# Patient Record
Sex: FEMALE | Race: WHITE | NOT HISPANIC OR LATINO | Employment: UNEMPLOYED | URBAN - METROPOLITAN AREA
[De-identification: names, ages, dates, MRNs, and addresses within clinical notes are randomized per-mention and may not be internally consistent; named-entity substitution may affect disease eponyms.]

---

## 2020-03-16 ENCOUNTER — HOSPITAL ENCOUNTER (EMERGENCY)
Facility: HOSPITAL | Age: 55
Discharge: HOME | End: 2020-03-16

## 2020-03-16 VITALS
DIASTOLIC BLOOD PRESSURE: 84 MMHG | OXYGEN SATURATION: 99 % | HEART RATE: 107 BPM | BODY MASS INDEX: 24.34 KG/M2 | RESPIRATION RATE: 21 BRPM | HEIGHT: 70 IN | TEMPERATURE: 99 F | SYSTOLIC BLOOD PRESSURE: 127 MMHG | WEIGHT: 170 LBS

## 2020-03-16 DIAGNOSIS — J06.9 VIRAL URI WITH COUGH: Primary | ICD-10-CM

## 2020-03-16 LAB
INFLUENZA A, MOLECULAR: NEGATIVE
INFLUENZA B, MOLECULAR: NEGATIVE
SPECIMEN SOURCE: NORMAL

## 2020-03-16 PROCEDURE — 87502 INFLUENZA DNA AMP PROBE: CPT

## 2020-03-16 PROCEDURE — U0002 COVID-19 LAB TEST NON-CDC: HCPCS

## 2020-03-16 PROCEDURE — 99282 EMERGENCY DEPT VISIT SF MDM: CPT

## 2020-03-16 NOTE — ED PROVIDER NOTES
Encounter Date: 3/16/2020    SCRIBE #1 NOTE: I, Fredo Long, eli scribing for, and in the presence of, Selena Alston NP.       History     Chief Complaint   Patient presents with    Fever    Cough     clear     Generalized Body Aches     since last night       Time seen by provider: 12:13 PM on 03/16/2020    Shari Stevens is a 54 y.o. female who presents to the ED with an onset of fever recorded up to 101.1° with associated generalized body aches and dry cough since last night. The patient states that she typically works in [place] and recently filed for unemployment. She endorses having ample amounts of Albuterol at home as well as a nebulizer. The patient takes Mobic for IBS. She denies any worsening or new shortness of breath, wheezing, or chest pain. PMHx includes arthritis and COPD. She is post-menopausal. No history of immuno-suppressant medications. No cardiopulmonary PSHx. SHx of current every day smoking, 0.5 PPD. NKDA.    The history is provided by the patient.     Review of patient's allergies indicates:  No Known Allergies  No past medical history on file.  No past surgical history on file.  No family history on file.  Social History     Tobacco Use    Smoking status: Not on file   Substance Use Topics    Alcohol use: Not on file    Drug use: Not on file     Review of Systems   Constitutional: Positive for fever.   HENT: Negative for sore throat.    Respiratory: Positive for cough. Negative for shortness of breath and wheezing.    Cardiovascular: Negative for chest pain.   Gastrointestinal: Negative for nausea.   Genitourinary: Negative for dysuria.   Musculoskeletal: Positive for arthralgias and myalgias. Negative for back pain.   Skin: Negative for rash.   Allergic/Immunologic: Negative for immunocompromised state.   Neurological: Negative for weakness.   Hematological: Does not bruise/bleed easily.       Physical Exam     Initial Vitals [03/16/20 1203]   BP Pulse Resp Temp SpO2    127/84 107 (!) 21 98.8 °F (37.1 °C) 99 %      MAP       --         Physical Exam    Nursing note and vitals reviewed.  Constitutional: Vital signs are normal. She appears well-developed and well-nourished.   HENT:   Head: Normocephalic and atraumatic.   Eyes: Pupils are equal, round, and reactive to light.   Neck: Neck supple.   Cardiovascular: Normal rate, regular rhythm, normal heart sounds and intact distal pulses. Exam reveals no gallop and no friction rub.    No murmur heard.  Regular rate and rhythm. Heart sounds are normal with no rubs, gallops, or murmurs.   Pulmonary/Chest: Breath sounds normal. No respiratory distress. She has no wheezes. She has no rhonchi. She has no rales.   Lung sounds clear and equal to auscultation bilaterally without wheezes, rhonchi, or rales.   Abdominal: Soft. Normal appearance and bowel sounds are normal. There is no tenderness.   Neurological: She is alert and oriented to person, place, and time. She has normal strength.   Skin: Skin is warm, dry and intact.   Psychiatric: She has a normal mood and affect. Her speech is normal and behavior is normal.         ED Course   Procedures  Labs Reviewed   INFLUENZA A & B BY MOLECULAR   SARS-COV-2 (COVID-19) QUALITATIVE PCR          Imaging Results    None          Medical Decision Making:   History:   Old Medical Records: I decided to obtain old medical records.  Differential Diagnosis:   URI  Pneumonia  Bronchitits  Clinical Tests:   Lab Tests: Ordered and Reviewed       APC / Resident Notes:   Patient is a 54 y.o. female who presents to the ED 03/16/2020 who underwent emergent evaluation for fever and cough for 1 day.   The patient appears to have a viral URI. Influenza testing negative. no sick contact with COVID 19. COVID19 testing pending given + risk factors and symptoms.   Patient instructed to remain in isolation for 14 days and practice standard precatuions.  Patient in no acute respiratory distress; she has no wheezing or  rhonchi. I do not think pneumonia or COPD exacerbation. She is a current every day smoker and is counseled on smoking cessation. Patient has no hypoxia or tachypnea and no accessory muscle use.   Patient does not appear septic or toxic. I do no think secondary bacterial infection such as bacterial pneumonia, sinusitis, sepsis, meningitis, or strep pharyngitis.  The patient does not appear dehydrated and is tolerating po fluids and urinating regularly today. I see no indication for antibiotics at this time and discussed supportive care at home with OTC medications. Based on my clinical evaluation, I do not appreciate any immediate, emergent, or life threatening condition or etiology that warrants additional workup today and feel that the patient can be discharged with close follow up care. Follow up and return precautions discussed; patient verbalized understanding and is agreeable to plan of care. Patient discharged home in stable condition.              Scribe Attestation:   Scribe #1: I performed the above scribed service and the documentation accurately describes the services I performed. I attest to the accuracy of the note.    Attending Attestation:           Physician Attestation for Scribe:  Physician Attestation Statement for Scribe #1: I, Selena Alston, reviewed documentation, as scribed by in my presence, and it is both accurate and complete.     Comments: I, Selena Alston NP-C, personally performed the services described in this documentation. All medical record entries made by the scribe were at my direction and in my presence.  I have reviewed the chart and agree that the record reflects my personal performance and is accurate and complete. AMRIT Alan.  12:32 PM 03/16/2020                            Clinical Impression:       ICD-10-CM ICD-9-CM   1. Viral URI with cough J06.9 465.9    B97.89          Disposition:   Disposition: Discharged  Condition: Stable     ED Disposition Condition     Discharge Stable        ED Prescriptions     None        Follow-up Information     Follow up With Specialties Details Why Contact Info    [facility]  In 2 days  [address] [address]  [phone]      [facility] Emergency Medicine  As needed, If symptoms worsen [address] [id number] [phone]                                     Selena Alston NP  03/16/20 4248

## 2020-03-16 NOTE — DISCHARGE INSTRUCTIONS
You've been tested for COVID-19. Your test results may take several days. You should follow-up with your doctor for test results. Otherwise, please self isolate at home for 14 days to avoid infecting other people.

## 2020-03-16 NOTE — ED NOTES
Pt here for evaluation of cough, fever and body aches since last night.  Pt is awake, alert and oriented. Resp non-labored.  Pt has productive cough with clear sputum.  Pt reports fever since last night.  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

## 2020-03-24 LAB — SARS-COV-2 RNA RESP QL NAA+PROBE: NOT DETECTED

## 2023-12-07 ENCOUNTER — HOSPITAL ENCOUNTER (EMERGENCY)
Facility: HOSPITAL | Age: 58
Discharge: HOME | End: 2023-12-07
Attending: EMERGENCY MEDICINE

## 2023-12-07 VITALS
SYSTOLIC BLOOD PRESSURE: 149 MMHG | DIASTOLIC BLOOD PRESSURE: 101 MMHG | WEIGHT: 180 LBS | TEMPERATURE: 98 F | OXYGEN SATURATION: 99 % | RESPIRATION RATE: 18 BRPM | HEART RATE: 91 BPM | BODY MASS INDEX: 26.66 KG/M2 | HEIGHT: 69 IN

## 2023-12-07 DIAGNOSIS — M54.50 ACUTE MIDLINE LOW BACK PAIN WITHOUT SCIATICA: Primary | ICD-10-CM

## 2023-12-07 LAB
BACTERIA #/AREA URNS HPF: NORMAL /HPF
BILIRUB UR QL STRIP: NEGATIVE
CLARITY UR: CLEAR
COLOR UR: YELLOW
GLUCOSE UR QL STRIP: NEGATIVE
HGB UR QL STRIP: NEGATIVE
KETONES UR QL STRIP: NEGATIVE
LEUKOCYTE ESTERASE UR QL STRIP: ABNORMAL
MICROSCOPIC COMMENT: NORMAL
NITRITE UR QL STRIP: NEGATIVE
PH UR STRIP: 6 [PH] (ref 5–8)
PROT UR QL STRIP: NEGATIVE
RBC #/AREA URNS HPF: 2 /HPF (ref 0–4)
SP GR UR STRIP: 1.02 (ref 1–1.03)
SQUAMOUS #/AREA URNS HPF: 5 /HPF
URN SPEC COLLECT METH UR: ABNORMAL
UROBILINOGEN UR STRIP-ACNC: NEGATIVE EU/DL
WBC #/AREA URNS HPF: 4 /HPF (ref 0–5)
YEAST URNS QL MICRO: NORMAL

## 2023-12-07 PROCEDURE — 63600175 PHARM REV CODE 636 W HCPCS: Performed by: NURSE PRACTITIONER

## 2023-12-07 PROCEDURE — 96372 THER/PROPH/DIAG INJ SC/IM: CPT | Performed by: NURSE PRACTITIONER

## 2023-12-07 PROCEDURE — 99284 EMERGENCY DEPT VISIT MOD MDM: CPT

## 2023-12-07 PROCEDURE — 81000 URINALYSIS NONAUTO W/SCOPE: CPT | Performed by: NURSE PRACTITIONER

## 2023-12-07 RX ORDER — MELOXICAM 15 MG/1
15 TABLET ORAL DAILY
Qty: 14 TABLET | Refills: 0 | Status: SHIPPED | OUTPATIENT
Start: 2023-12-07 | End: 2023-12-21

## 2023-12-07 RX ORDER — KETOROLAC TROMETHAMINE 30 MG/ML
30 INJECTION, SOLUTION INTRAMUSCULAR; INTRAVENOUS
Status: COMPLETED | OUTPATIENT
Start: 2023-12-07 | End: 2023-12-07

## 2023-12-07 RX ADMIN — KETOROLAC TROMETHAMINE 30 MG: 30 INJECTION, SOLUTION INTRAMUSCULAR at 09:12

## 2023-12-07 NOTE — ED PROVIDER NOTES
"Source of History:  Patient, chart    Chief complaint:  Back Pain (Lower back pain x 2 months -- denies injury. Currently on day 4 bactrim for UTI)      HPI:  Shari Stevens is a 58 y.o. female presenting with  midline low back pain.  Patient states pain has been ongoing for the past 2 months.  Patient denies any falls or trauma.  Patient states she was recently started on gabapentin but does not take it.  Patient states I had a friend who lost her memory because of it.  Patient states she is been taking BC powders and ibuprofen 6 with minimal relief.  Patient denies any bowel or bladder incontinence.  No numbness or tingling of groin.    This is the extent to the patients complaints today here in the emergency department.    ROS: As per HPI and below:  Constitutional: No fever.  No chills.  Eyes: No visual changes.   ENT: No sore throat. No ear pain.  Urinary: No abnormal urination.  MSK: Positive for back pain  Integument: No rashes or lesions.    Review of patient's allergies indicates:   Allergen Reactions    Cephalexin Diarrhea       PMH:  As per HPI and below:  No past medical history on file.  No past surgical history on file.         Physical Exam:    BP (!) 149/101   Pulse 91   Temp 98 °F (36.7 °C) (Oral)   Resp 18   Ht 5' 9" (1.753 m)   Wt 81.6 kg (180 lb)   SpO2 99%   BMI 26.58 kg/m²   Nursing note and vital signs reviewed.  Constitutional: No acute distress.  Nontoxic  Head:  Normocephalic atraumatic  Eyes: No conjunctival injection.  Extraocular muscles are intact.  ENT: Normal phonation.  Musculoskeletal:  Pt endorsing pain to mid lower back.  Pain not reproducible.  Negative bilateral straight leg test.  Steady gait appreciated.  Strength 5/5 in BLE.    Skin: No rashes seen.  Good turgor.  No abrasions.  No ecchymoses.  Psych: Anxious appearing, conversant.    [facility]    Emergent evaluation of an anxious appearing 59 yo female presenting with midline low back pain for the past 2 months.  " Patient states she takes ibuprofen and BC powders with no relief of pain.  Patient states she was recently prescribed gabapentin but is not taking.  Patient denies any numbness or tingling of groin.  No bowel or bladder incontinence.  On exam pt is A&Ox3. VSS. Nonfebrile and nontoxic appearing.  Mucous membranes pink and moist. Abdomen soft and nontender. No rebound or guarding appreciated on exam.   BS WNL.  No CVA tenderness to palpation. Negative bilateral straight leg test.  Steady gait appreciated.  Strength 5/5 in BLE. No red flags on exam.  Pt speaking in full sentences. Cap refill < 3 seconds.      History Acquisition   Additional history was acquired from other historians.  Chart    The patient's list of active medical problems, social history, medications, and allergies as documented per RN staff has been reviewed.     Differential Diagnoses   Based on available information and the initial assessment, the working differential diagnoses considered during this evaluation include but are not limited to DJD, arthritis, chronic pain, radiculopathy, sciatica, others.  I considered but do not suspect cauda equina syndrome, fracture or spinal abscess due to presentation.    I will get urine, medicate and reassess      LABS     Labs Reviewed   URINALYSIS, REFLEX TO URINE CULTURE - Abnormal; Notable for the following components:       Result Value    Leukocytes, UA 1+ (*)     All other components within normal limits    Narrative:     Specimen Source->Urine   URINALYSIS MICROSCOPIC    Narrative:     Specimen Source->Urine     Additional Consideration   All available testing was considered during the course of this workup.  Comorbidities taken into consideration during the patient's evaluation and treatment include weight, age.    Social determinants of health were taken into consideration during development of our treatment plan.    Medications   ketorolac injection 30 mg (30 mg Intramuscular Given 12/7/23 0922)       ED Course as of 12/07/23 1053   Thu Dec 07, 2023   1030 UA negative for any acute infectious process.  Patient updated on results.  Advised to continue with antibiotics as prescribed.  Advised to follow up with PCP for chronic pain.  Advised will place referral for orthopedic follow-up.  Patient given strict return to ED precautions.  Patient verbalized understanding of this plan of care.  All questions and concerns addressed. [RZ]   1041 Patient is hemodynamically stable, vital signs are normal. Discharge instructions given. Return to ED precautions discussed. Follow up as directed. Pt verbalized understanding of this plan.  Pt is stable for discharge.  [RZ]      ED Course User Index  [RZ] Angela Lopez NP             CLINICAL IMPRESSION  1. Acute midline low back pain without sciatica         ED Disposition Condition    Discharge Stable            Instruction:  I see no indication of an emergent process beyond that addressed during our encounter but have duly counseled the patient/family regarding the need for prompt follow-up as well as the indications that should prompt immediate return to the emergency room should new or worrisome developments occur.  The patient/family has been provided with verbal and printed direction regarding our final diagnosis(es) as well as instructions regarding use of OTC and/or Rx medications intended to manage the patient's aforementioned conditions including:  ED Prescriptions       Medication Sig Dispense Start Date End Date Auth. Provider    meloxicam (MOBIC) 15 MG tablet Take 1 tablet (15 mg total) by mouth once daily. for 14 days 14 tablet 12/7/2023 12/21/2023 Angela Lopez NP          Patient has been advised of following recommended follow-up instructions:  Follow-up Information       Follow up With Specialties Details Why Contact [facility]  Schedule an appointment as soon as possible for a visit  as needed [address]  [place] [place] [address]  [phone]      [facility] Surgical Oncology, Orthopedic Surgery, Genetics, Physical Medicine and Rehabilitation, Occupational Therapy, Radiology Schedule an appointment as soon as possible for a visit  with orthopedics [address] [place] [id number]  [phone]            The patient/family communicates understanding of all this information and all remaining questions and concerns were addressed at this time.      The patient's condition did not warrant review of the  and prescription of controlled substances.      This note was created using dictation software.  This program may occasionally mistype words and phrases.         Angela Lopez, NP  12/07/23 1052

## 2023-12-07 NOTE — DISCHARGE INSTRUCTIONS
You were seen and evaluated in the ER today.  Your workup while you were here is reassuring for no acute causes of your symptoms.  Your pain is likely due to arthritis.  We have prescribed you medications to help with your pain.  We have placed a referral for orthopedic follow-up.  Please follow-up with your PCP as needed.  Please return to the ED for any worsening symptoms such as chest pain, shortness of breath, fever not controlled with Tylenol or ibuprofen or uncontrolled pain.      Our goal in the emergency department is to always give you outstanding care and exceptional service. You may receive a survey by mail or e-mail in the next week regarding your experience in our ED. We would greatly appreciate your completing and returning the survey. Your feedback provides us with a way to recognize our staff who give very good care and it helps us learn how to improve when your experience was below our aspiration of excellence.